# Patient Record
Sex: FEMALE
[De-identification: names, ages, dates, MRNs, and addresses within clinical notes are randomized per-mention and may not be internally consistent; named-entity substitution may affect disease eponyms.]

---

## 2019-07-30 ENCOUNTER — HOSPITAL ENCOUNTER (INPATIENT)
Dept: HOSPITAL 88 - ER | Age: 59
LOS: 4 days | Discharge: HOME | DRG: 354 | End: 2019-08-03
Attending: INTERNAL MEDICINE | Admitting: INTERNAL MEDICINE
Payer: COMMERCIAL

## 2019-07-30 VITALS — DIASTOLIC BLOOD PRESSURE: 78 MMHG | SYSTOLIC BLOOD PRESSURE: 175 MMHG

## 2019-07-30 VITALS — WEIGHT: 185 LBS | BODY MASS INDEX: 34.04 KG/M2 | HEIGHT: 62 IN

## 2019-07-30 DIAGNOSIS — L02.216: ICD-10-CM

## 2019-07-30 DIAGNOSIS — T81.89XA: ICD-10-CM

## 2019-07-30 DIAGNOSIS — K42.9: Primary | ICD-10-CM

## 2019-07-30 DIAGNOSIS — L03.316: ICD-10-CM

## 2019-07-30 LAB
ALBUMIN SERPL-MCNC: 3.9 G/DL (ref 3.5–5)
ALBUMIN/GLOB SERPL: 1 {RATIO} (ref 0.8–2)
ALP SERPL-CCNC: 177 IU/L (ref 40–150)
ALT SERPL-CCNC: 37 IU/L (ref 0–55)
ANION GAP SERPL CALC-SCNC: 15.1 MMOL/L (ref 8–16)
ANISOCYTOSIS BLD QL SMEAR: (no result)
BACTERIA URNS QL MICRO: (no result) /HPF
BASOPHILS # BLD AUTO: 0 10*3/UL (ref 0–0.1)
BASOPHILS NFR BLD AUTO: 0.3 % (ref 0–1)
BILIRUB UR QL: NEGATIVE
BUN SERPL-MCNC: 11 MG/DL (ref 7–26)
BUN/CREAT SERPL: 15 (ref 6–25)
CALCIUM SERPL-MCNC: 9.6 MG/DL (ref 8.4–10.2)
CHLORIDE SERPL-SCNC: 102 MMOL/L (ref 98–107)
CLARITY UR: (no result)
CO2 SERPL-SCNC: 24 MMOL/L (ref 22–29)
COLOR UR: YELLOW
DEPRECATED NEUTROPHILS # BLD AUTO: 4 10*3/UL (ref 2.1–6.9)
DEPRECATED RBC URNS MANUAL-ACNC: (no result) /HPF (ref 0–5)
EGFRCR SERPLBLD CKD-EPI 2021: > 60 ML/MIN (ref 60–?)
EOSINOPHIL # BLD AUTO: 0.1 10*3/UL (ref 0–0.4)
EOSINOPHIL NFR BLD AUTO: 1.3 % (ref 0–6)
EPI CELLS URNS QL MICRO: (no result) /LPF
ERYTHROCYTE [DISTWIDTH] IN CORD BLOOD: 13.3 % (ref 11.7–14.4)
GLOBULIN PLAS-MCNC: 3.8 G/DL (ref 2.3–3.5)
GLUCOSE SERPLBLD-MCNC: 99 MG/DL (ref 74–118)
HCT VFR BLD AUTO: 40.4 % (ref 34.2–44.1)
HGB BLD-MCNC: 13.6 G/DL (ref 12–16)
HOWELL-JOLLY BOD BLD QL SMEAR: (no result)
HYPOCHROMIA BLD QL SMEAR: SLIGHT
KETONES UR QL STRIP.AUTO: NEGATIVE
LEUKOCYTE ESTERASE UR QL STRIP.AUTO: NEGATIVE
LYMPHOCYTES # BLD: 1.9 10*3/UL (ref 1–3.2)
LYMPHOCYTES NFR BLD AUTO: 29.7 % (ref 18–39.1)
MCH RBC QN AUTO: 31.2 PG (ref 28–32)
MCHC RBC AUTO-ENTMCNC: 33.7 G/DL (ref 31–35)
MCV RBC AUTO: 92.7 FL (ref 81–99)
MONOCYTES # BLD AUTO: 0.4 10*3/UL (ref 0.2–0.8)
MONOCYTES NFR BLD AUTO: 5.6 % (ref 4.4–11.3)
NEUTS SEG NFR BLD AUTO: 62.8 % (ref 38.7–80)
NITRITE UR QL STRIP.AUTO: NEGATIVE
PLAT MORPH BLD: NORMAL
PLATELET # BLD AUTO: 288 X10E3/UL (ref 140–360)
PLATELET # BLD EST: ADEQUATE 10*3/UL
POIKILOCYTOSIS BLD QL SMEAR: (no result)
POTASSIUM SERPL-SCNC: 4.1 MMOL/L (ref 3.5–5.1)
PROT UR QL STRIP.AUTO: NEGATIVE
RBC # BLD AUTO: 4.36 X10E6/UL (ref 3.6–5.1)
RBC MORPH BLD: (no result)
SODIUM SERPL-SCNC: 137 MMOL/L (ref 136–145)
SP GR UR STRIP: 1.01 (ref 1.01–1.02)
UROBILINOGEN UR STRIP-MCNC: 0.2 MG/DL (ref 0.2–1)
WBC #/AREA URNS HPF: (no result) /HPF (ref 0–5)

## 2019-07-30 PROCEDURE — 80048 BASIC METABOLIC PNL TOTAL CA: CPT

## 2019-07-30 PROCEDURE — 96372 THER/PROPH/DIAG INJ SC/IM: CPT

## 2019-07-30 PROCEDURE — 87075 CULTR BACTERIA EXCEPT BLOOD: CPT

## 2019-07-30 PROCEDURE — 82948 REAGENT STRIP/BLOOD GLUCOSE: CPT

## 2019-07-30 PROCEDURE — 85025 COMPLETE CBC W/AUTO DIFF WBC: CPT

## 2019-07-30 PROCEDURE — 81001 URINALYSIS AUTO W/SCOPE: CPT

## 2019-07-30 PROCEDURE — 74177 CT ABD & PELVIS W/CONTRAST: CPT

## 2019-07-30 PROCEDURE — 99284 EMERGENCY DEPT VISIT MOD MDM: CPT

## 2019-07-30 PROCEDURE — 36415 COLL VENOUS BLD VENIPUNCTURE: CPT

## 2019-07-30 PROCEDURE — 87086 URINE CULTURE/COLONY COUNT: CPT

## 2019-07-30 PROCEDURE — 87205 SMEAR GRAM STAIN: CPT

## 2019-07-30 PROCEDURE — 80053 COMPREHEN METABOLIC PANEL: CPT

## 2019-07-30 PROCEDURE — 87071 CULTURE AEROBIC QUANT OTHER: CPT

## 2019-07-30 RX ADMIN — INSULIN HUMAN SCH UNIT: 100 INJECTION, SOLUTION PARENTERAL at 21:00

## 2019-07-30 RX ADMIN — LEVOFLOXACIN SCH MLS/HR: 5 INJECTION, SOLUTION INTRAVENOUS at 17:19

## 2019-07-30 NOTE — DIAGNOSTIC IMAGING REPORT
EXAM:  CT of the abdomen and pelvis WITH contrast



HISTORY:  Abdominal pain, rule out periumbilical abscess versus hernia, history

of appendectomy and 

COMPARISON:  None available.



TECHNIQUE: 

The abdomen and pelvis were scanned utilizing a multidetector helical scanner. 

Coronal and sagittal reformats are provided.

            PROTOCOL:  Routine

            IV CONTRAST:  100 cc of Isovue-370.

            ORAL CONTRAST:  Water

            RADIATION DOSE: Total DLP: 710.55 mGy*cm

                      Estimated effective dose:  (DLP x 0.015 x size factor)

Dose modulation, iterative reconstruction, and/or weight based adjustment of

the mA/kV was utilized to reduce the radiation dose to as low as reasonably

achievable.

            COMPLICATIONS: None



FINDINGS:

LOWER THORAX: Unremarkable.



HEPATOBILIARY:  

No mass.

No biliary dilation.

No calcified gallstone.

SPLEEN: No splenomegaly.     

PANCREAS: No focal masses or ductal dilatation.     

ADRENALS:  No discrete adrenal nodule.

KIDNEYS/URETERS: 

No hydronephrosis, stones, or definite solid mass lesions.    



PELVIC ORGANS/BLADDER: The visualized pelvic organs appear unremarkable.



GI TRACT: 

No dilation or wall thickening identified.



PERITONEUM / RETROPERITONEUM: No free air or fluid.

LYMPH NODES: No pathologically enlarged lymph node.

VESSELS: Minimal scattered atherosclerotic vascular calcifications.

BONES and JOINTS:  No aggressive osseous lesion or acute fracture. 

SOFT TISSUES: A small fat-containing umbilical hernia. In the superficial

periumbilical region there is prominent focal fat stranding,     2.2  cm (AP) x

 2.5  cm (ML) x  2.5  cm (CC). No central drainable fluid collection.





IMPRESSION: 

Prominent focal, superficial, fat stranding in the periumbilical region.

Considerations include vascular compromise involving the most superficial

portion of a small fat-containing umbilical hernia or evolving superficial

phlegmon.



Signed by: Dr. Pernell Segura D.O., M.M.M. on 2019 5:57 PM

## 2019-07-30 NOTE — NUR
PATIENT AWAKE AND ALERT. RESP EVEN AND UNLABORED. SKIN WARM AND DRY. NO SIGNS 
OF ACUTE DISTRESS NOTED AT THIS TIME. STATES PAIN "0" NOW AFTER PAIN 
MEDICATION. EDUCATED PATIENT ON THE CURRENT PLAN OF CARE,VERBALIZED 
UNDERSTANDING.

## 2019-07-31 VITALS — DIASTOLIC BLOOD PRESSURE: 67 MMHG | SYSTOLIC BLOOD PRESSURE: 146 MMHG

## 2019-07-31 VITALS — DIASTOLIC BLOOD PRESSURE: 70 MMHG | SYSTOLIC BLOOD PRESSURE: 164 MMHG

## 2019-07-31 VITALS — SYSTOLIC BLOOD PRESSURE: 151 MMHG | DIASTOLIC BLOOD PRESSURE: 70 MMHG

## 2019-07-31 VITALS — SYSTOLIC BLOOD PRESSURE: 135 MMHG | DIASTOLIC BLOOD PRESSURE: 62 MMHG

## 2019-07-31 VITALS — DIASTOLIC BLOOD PRESSURE: 72 MMHG | SYSTOLIC BLOOD PRESSURE: 152 MMHG

## 2019-07-31 VITALS — SYSTOLIC BLOOD PRESSURE: 164 MMHG | DIASTOLIC BLOOD PRESSURE: 70 MMHG

## 2019-07-31 VITALS — DIASTOLIC BLOOD PRESSURE: 62 MMHG | SYSTOLIC BLOOD PRESSURE: 129 MMHG

## 2019-07-31 VITALS — SYSTOLIC BLOOD PRESSURE: 135 MMHG | DIASTOLIC BLOOD PRESSURE: 63 MMHG

## 2019-07-31 LAB
ALBUMIN SERPL-MCNC: 3.3 G/DL (ref 3.5–5)
ALBUMIN/GLOB SERPL: 0.9 {RATIO} (ref 0.8–2)
ALP SERPL-CCNC: 152 IU/L (ref 40–150)
ALT SERPL-CCNC: 33 IU/L (ref 0–55)
ANION GAP SERPL CALC-SCNC: 14.1 MMOL/L (ref 8–16)
BASOPHILS # BLD AUTO: 0 10*3/UL (ref 0–0.1)
BASOPHILS NFR BLD AUTO: 0.3 % (ref 0–1)
BUN SERPL-MCNC: 11 MG/DL (ref 7–26)
BUN/CREAT SERPL: 15 (ref 6–25)
CALCIUM SERPL-MCNC: 9.2 MG/DL (ref 8.4–10.2)
CHLORIDE SERPL-SCNC: 105 MMOL/L (ref 98–107)
CO2 SERPL-SCNC: 23 MMOL/L (ref 22–29)
DEPRECATED NEUTROPHILS # BLD AUTO: 6.6 10*3/UL (ref 2.1–6.9)
EGFRCR SERPLBLD CKD-EPI 2021: > 60 ML/MIN (ref 60–?)
EOSINOPHIL # BLD AUTO: 0.1 10*3/UL (ref 0–0.4)
EOSINOPHIL NFR BLD AUTO: 0.8 % (ref 0–6)
ERYTHROCYTE [DISTWIDTH] IN CORD BLOOD: 13.1 % (ref 11.7–14.4)
GLOBULIN PLAS-MCNC: 3.5 G/DL (ref 2.3–3.5)
GLUCOSE SERPLBLD-MCNC: 172 MG/DL (ref 74–118)
HCT VFR BLD AUTO: 37.1 % (ref 34.2–44.1)
HGB BLD-MCNC: 12.1 G/DL (ref 12–16)
LYMPHOCYTES # BLD: 1.7 10*3/UL (ref 1–3.2)
LYMPHOCYTES NFR BLD AUTO: 18.7 % (ref 18–39.1)
MCH RBC QN AUTO: 30.6 PG (ref 28–32)
MCHC RBC AUTO-ENTMCNC: 32.6 G/DL (ref 31–35)
MCV RBC AUTO: 93.9 FL (ref 81–99)
MONOCYTES # BLD AUTO: 0.4 10*3/UL (ref 0.2–0.8)
MONOCYTES NFR BLD AUTO: 5 % (ref 4.4–11.3)
NEUTS SEG NFR BLD AUTO: 74.9 % (ref 38.7–80)
PLATELET # BLD AUTO: 220 X10E3/UL (ref 140–360)
POTASSIUM SERPL-SCNC: 4.1 MMOL/L (ref 3.5–5.1)
RBC # BLD AUTO: 3.95 X10E6/UL (ref 3.6–5.1)
SODIUM SERPL-SCNC: 138 MMOL/L (ref 136–145)

## 2019-07-31 PROCEDURE — 0J9C0ZZ DRAINAGE OF PELVIC REGION SUBCUTANEOUS TISSUE AND FASCIA, OPEN APPROACH: ICD-10-PCS | Performed by: SURGERY

## 2019-07-31 PROCEDURE — 0WQF0ZZ REPAIR ABDOMINAL WALL, OPEN APPROACH: ICD-10-PCS | Performed by: SURGERY

## 2019-07-31 RX ADMIN — CLINDAMYCIN PHOSPHATE SCH MLS/HR: 18 INJECTION, SOLUTION INTRAVENOUS at 11:49

## 2019-07-31 RX ADMIN — CLINDAMYCIN PHOSPHATE SCH MLS/HR: 18 INJECTION, SOLUTION INTRAVENOUS at 00:00

## 2019-07-31 RX ADMIN — INSULIN HUMAN SCH UNIT: 100 INJECTION, SOLUTION PARENTERAL at 07:30

## 2019-07-31 RX ADMIN — HYDROCODONE BITARTRATE AND ACETAMINOPHEN PRN EA: 5; 325 TABLET ORAL at 11:57

## 2019-07-31 RX ADMIN — CLINDAMYCIN PHOSPHATE SCH MLS/HR: 18 INJECTION, SOLUTION INTRAVENOUS at 06:00

## 2019-07-31 RX ADMIN — ENOXAPARIN SODIUM SCH MG: 40 INJECTION SUBCUTANEOUS at 17:00

## 2019-07-31 RX ADMIN — SODIUM CHLORIDE PRN MG: 900 INJECTION INTRAVENOUS at 00:19

## 2019-07-31 RX ADMIN — INSULIN HUMAN SCH UNIT: 100 INJECTION, SOLUTION PARENTERAL at 16:06

## 2019-07-31 RX ADMIN — CLINDAMYCIN PHOSPHATE SCH MLS/HR: 18 INJECTION, SOLUTION INTRAVENOUS at 18:00

## 2019-07-31 RX ADMIN — INSULIN HUMAN SCH UNIT: 100 INJECTION, SOLUTION PARENTERAL at 11:30

## 2019-07-31 RX ADMIN — SODIUM CHLORIDE PRN MG: 900 INJECTION INTRAVENOUS at 10:45

## 2019-07-31 RX ADMIN — HYDROCODONE BITARTRATE AND ACETAMINOPHEN PRN EA: 5; 325 TABLET ORAL at 23:55

## 2019-07-31 RX ADMIN — LEVOFLOXACIN SCH MLS/HR: 5 INJECTION, SOLUTION INTRAVENOUS at 15:45

## 2019-07-31 RX ADMIN — INSULIN HUMAN SCH UNIT: 100 INJECTION, SOLUTION PARENTERAL at 21:20

## 2019-07-31 RX ADMIN — CLINDAMYCIN PHOSPHATE SCH MLS/HR: 18 INJECTION, SOLUTION INTRAVENOUS at 23:30

## 2019-07-31 NOTE — NUR
PT RESTING AND GIVEN PAIN MEDICATION X1 .FAMILY AT THE BEDSIDE .CALL LIGHT WITH IN REACH 
.CONTINUE TO MONITOR 

-------------------------------------------------------------------------------

Addendum: 07/31/19 at 0643 by Nancy Wang RN

-------------------------------------------------------------------------------

WRONG PT

## 2019-07-31 NOTE — NUR
DR. DOYLE at the bedside, discussed with the patient that he was going to consult DR. PROCTOR 
for antibiotic therapy.  also gave verbal orders to d/c the IV fluids and saline lock the 
patient.

## 2019-07-31 NOTE — OPERATIVE REPORT
DATE OF PROCEDURE:  07/31/2019

 

SURGEON:  Jonathan Hurt MD

 

PREOPERATIVE DIAGNOSES:  Umbilical abscess and umbilical hernia.

 

POSTOPERATIVE DIAGNOSES:  Umbilical abscess and umbilical hernia.

 

OPERATIVE PROCEDURE:  Drainage of umbilical abscess and repair of umbilical hernia.

 

ANESTHESIA:  General, Dr. Campuzano.

 

INDICATION:  A 59-year-old female with one-week history of umbilical pain, swelling, and

redness.  CT scan showed umbilical hernia with fat stranding in the umbilical region

suggestive of evolving infection.  The patient is diagnosed with possible umbilical

abscess secondary to suture granuloma and she has consented for drainage for abscess,

possible removal of suture granuloma and umbilical hernia repair. 

 

PROCEDURE FINDING:  No suture material found.

 

DESCRIPTION OF PROCEDURE:  The patient was brought to OR, intubated.  The abdomen was

prepped with alcohol and draped in sterile fashion.  The small umbilical wound was

enlarged in the transverse direction and exploring the depth of the wound, no suture

material from prior surgeries found even though the patient has a long low midline scar

starting at the umbilicus.  Thorough exploration revealed the umbilical hernia

approximately 2 cm in size, which is a fascial defect.  We then irrigated the wound

thoroughly and closed the fascial defect with figure-of-eight stitch of 0 Vicryl suture.

 The wound was left open and packed with iodoform gauze.  Hemostasis achieved.  Local

anesthesia with 0.25% Marcaine and epinephrine were injected into the wound.  The

patient was then extubated and transported to recovery room. 

 

ESTIMATED BLOOD LOSS:  Minimal.

 

 

 

 

______________________________

Jonathan Hurt MD

 

DNL/MODL

D:  07/31/2019 09:54:20

T:  07/31/2019 16:06:18

Job #:  [id number]

## 2019-07-31 NOTE — NUR
received report from PACU. pt is s/p I&D of abscess at the umbilicus. incision site is 
packed with iodoform and covered with dressing. vital signs are 112/81, hr 65, temp 98.0 
degrees F, O2 sat 94% on room air

## 2019-07-31 NOTE — NUR
PT RESTING .PT C/O ABD PAIN AND GIVEN MORPHINE WITH ZOFRAN .CALL BELL WITH IN REACH 
.CONTINUE TO MONITOR

## 2019-07-31 NOTE — CONSULTATION
DATE OF CONSULTATION:  2019  

 

CHIEF COMPLAINT:  Umbilical pain.

 

HISTORY OF PRESENT ILLNESS:  This patient is a 59-year-old female with 1-week history of

periumbilical redness, swelling and pain with no fever or chills.  No prior episode.

The patient was noted to have drainage from the umbilical area in this a.m. 

 

PAST MEDICAL HISTORY:  Positive for hypertension, diabetes, hypothyroidism.

 

PAST SURGICAL HISTORY:  Positive for hysterectomy, appendectomy, and .

 

ALLERGIES:  SHE HAS NO DRUG ALLERGIES.

 

SOCIAL HABITS:  The patient denies smoking or alcohol abuse.

 

REVIEW OF SYSTEMS:

No chest pain, shortness of breath.

 

PHYSICAL EXAMINATION:

VITAL SIGNS:  Stable.  She is afebrile.  She is awake, alert, in moderate discomfort. 

HEENT:  Sclerae anicteric. 

NECK:  Supple. 

LUNGS:  Clear. 

HEART:  Regular rate and rhythm. 

ABDOMEN:  Soft.  There is periumbilical erythema with a small opening in the depth of

the umbilicus with exudative drainage.  Abdomen otherwise soft. 

EXTREMITIES:  No cyanosis or edema.

LABORATORY DATA:  White cell count is 8, hemoglobin of 12.  Creatinine of 0.7.  CT scan

of the abdomen showed inflammatory fat stranding in the periumbilical region. 

 

ASSESSMENT:  Probable umbilical abscess secondary to suture granuloma.

 

PLAN:  Exploration of the umbilical with drainage for abscess and removal of foreign

body/suture material.  Attendant risks discussed. 

 

 

 

 

______________________________

Jonathan Hurt MD

 

DNMADHAVI/MODL

D:  2019 08:33:24

T:  2019 14:21:39

Job #:  [id number]

## 2019-08-01 VITALS — DIASTOLIC BLOOD PRESSURE: 60 MMHG | SYSTOLIC BLOOD PRESSURE: 130 MMHG

## 2019-08-01 VITALS — DIASTOLIC BLOOD PRESSURE: 57 MMHG | SYSTOLIC BLOOD PRESSURE: 115 MMHG

## 2019-08-01 VITALS — DIASTOLIC BLOOD PRESSURE: 68 MMHG | SYSTOLIC BLOOD PRESSURE: 148 MMHG

## 2019-08-01 VITALS — DIASTOLIC BLOOD PRESSURE: 66 MMHG | SYSTOLIC BLOOD PRESSURE: 140 MMHG

## 2019-08-01 VITALS — SYSTOLIC BLOOD PRESSURE: 131 MMHG | DIASTOLIC BLOOD PRESSURE: 65 MMHG

## 2019-08-01 VITALS — DIASTOLIC BLOOD PRESSURE: 71 MMHG | SYSTOLIC BLOOD PRESSURE: 134 MMHG

## 2019-08-01 VITALS — SYSTOLIC BLOOD PRESSURE: 130 MMHG | DIASTOLIC BLOOD PRESSURE: 60 MMHG

## 2019-08-01 LAB
ANION GAP SERPL CALC-SCNC: 14.1 MMOL/L (ref 8–16)
BASOPHILS # BLD AUTO: 0 10*3/UL (ref 0–0.1)
BASOPHILS NFR BLD AUTO: 0.1 % (ref 0–1)
BUN SERPL-MCNC: 12 MG/DL (ref 7–26)
BUN/CREAT SERPL: 17 (ref 6–25)
CALCIUM SERPL-MCNC: 9.3 MG/DL (ref 8.4–10.2)
CHLORIDE SERPL-SCNC: 106 MMOL/L (ref 98–107)
CO2 SERPL-SCNC: 23 MMOL/L (ref 22–29)
DEPRECATED NEUTROPHILS # BLD AUTO: 5.1 10*3/UL (ref 2.1–6.9)
EGFRCR SERPLBLD CKD-EPI 2021: > 60 ML/MIN (ref 60–?)
EOSINOPHIL # BLD AUTO: 0 10*3/UL (ref 0–0.4)
EOSINOPHIL NFR BLD AUTO: 0.1 % (ref 0–6)
ERYTHROCYTE [DISTWIDTH] IN CORD BLOOD: 13.2 % (ref 11.7–14.4)
GLUCOSE SERPLBLD-MCNC: 162 MG/DL (ref 74–118)
HCT VFR BLD AUTO: 34.4 % (ref 34.2–44.1)
HGB BLD-MCNC: 11.2 G/DL (ref 12–16)
LYMPHOCYTES # BLD: 2.3 10*3/UL (ref 1–3.2)
LYMPHOCYTES NFR BLD AUTO: 28.8 % (ref 18–39.1)
MCH RBC QN AUTO: 30.7 PG (ref 28–32)
MCHC RBC AUTO-ENTMCNC: 32.6 G/DL (ref 31–35)
MCV RBC AUTO: 94.2 FL (ref 81–99)
MONOCYTES # BLD AUTO: 0.4 10*3/UL (ref 0.2–0.8)
MONOCYTES NFR BLD AUTO: 5.5 % (ref 4.4–11.3)
NEUTS SEG NFR BLD AUTO: 65.2 % (ref 38.7–80)
PLATELET # BLD AUTO: 247 X10E3/UL (ref 140–360)
POTASSIUM SERPL-SCNC: 4.1 MMOL/L (ref 3.5–5.1)
RBC # BLD AUTO: 3.65 X10E6/UL (ref 3.6–5.1)
SODIUM SERPL-SCNC: 139 MMOL/L (ref 136–145)

## 2019-08-01 RX ADMIN — INSULIN HUMAN SCH UNIT: 100 INJECTION, SOLUTION PARENTERAL at 20:46

## 2019-08-01 RX ADMIN — CLINDAMYCIN PHOSPHATE SCH MLS/HR: 18 INJECTION, SOLUTION INTRAVENOUS at 11:50

## 2019-08-01 RX ADMIN — LEVOFLOXACIN SCH MLS/HR: 5 INJECTION, SOLUTION INTRAVENOUS at 15:15

## 2019-08-01 RX ADMIN — HYDROCODONE BITARTRATE AND ACETAMINOPHEN PRN EA: 5; 325 TABLET ORAL at 12:11

## 2019-08-01 RX ADMIN — CLINDAMYCIN PHOSPHATE SCH MLS/HR: 18 INJECTION, SOLUTION INTRAVENOUS at 17:07

## 2019-08-01 RX ADMIN — ENOXAPARIN SODIUM SCH MG: 40 INJECTION SUBCUTANEOUS at 17:07

## 2019-08-01 RX ADMIN — INSULIN HUMAN SCH UNIT: 100 INJECTION, SOLUTION PARENTERAL at 07:30

## 2019-08-01 RX ADMIN — INSULIN HUMAN SCH UNIT: 100 INJECTION, SOLUTION PARENTERAL at 11:52

## 2019-08-01 RX ADMIN — INSULIN HUMAN SCH UNIT: 100 INJECTION, SOLUTION PARENTERAL at 17:12

## 2019-08-01 RX ADMIN — CLINDAMYCIN PHOSPHATE SCH MLS/HR: 18 INJECTION, SOLUTION INTRAVENOUS at 05:51

## 2019-08-01 NOTE — NUR
Nutrition Screen Note



RD Recommendation for Physician: 

-Continue current diet as ordered 



Plan of Care: RD following, monitoring for tolerance and adequacy



Nutrition reason for involvement:

Nutrition Risk Trigger  MST 



Primary Diagnose(s): Abdominal wall periumbilical abscess, status post incision with 
underlying cellulitis.



PMH: None 



Ht: 62in 

Wt: 210lb verified with bedscale

BMI: 38.4kg/m2

IBW: 110lb +/- 10%



RD Assessment:

(8/1) Chart reviewed. Labs and meds reviewed. 60yo F, who was admitted for umbilical pain. 
Visited pt in the room. Pt reported good appetite with 100% meal intake. Pt complained of 
some abdominal discomfort but denied any nausea or vomiting. Pt also denied any chewing or 
swallowing difficulty. Weight has been stable with reported UBW ~200-205lbs. Will continue 
to monitor and follow. 



Current Diet: ADA 1800



Malnutrition Evaluation (8/1/2019)

The patient does not meet criteria for a specified degree of malnutrition at this time. Will 
re-evaluate at follow-up as appropriate. 



Diet Education Needs Assessment:

Diet education not indicated.



Nutrition Care Level: low 





Signed: Marisa Redd, MS, RD, LD

## 2019-08-01 NOTE — HISTORY AND PHYSICAL
CHIEF COMPLAINT:  Umbilical pain.

 

HISTORY OF PRESENT ILLNESS:  This is a 59-year-old  female, who reports that she

has been having this umbilical redness and pain ongoing for the last week.  The patient

reports initially she thought that after picking up some object that she had pain that

it was related to heavy objects.  Instead, she reports having some subjective fevers at

home.  She denies any chest pain, palpitation, nausea, or vomiting.  Denies any

significant abdominal pain.  The patient was concerned and came into the ED for further

evaluation.  On imaging studies, there was some concern of a possible periumbilical

obstruction that ends up being more of a periumbilical superficial phlegmon, in which

the patient underwent an incision and drainage by General Surgery.  The patient is seen

and evaluated at bedside on the medical floor.  She is currently doing well with no

other complaints at this time. 

 

REVIEW OF SYSTEMS:

Pertinent positives:  Periumbilical pain. 

 

Pertinent negatives:  Denies any chest pain, palpitation, nausea, vomiting, diarrhea,

dysuria, hematuria, frequency, urgency, lightheadedness, dizziness, abdominal pain,

headaches, shortness of breath, cough, congestion, fever, or any other complaints. 

 

The rest of 14-point review of systems are reviewed with the patient and are negative.

 

ALLERGIES:  NO KNOWN DRUG ALLERGIES.

 

HOME MEDICATIONS:  None.

 

PAST MEDICAL HISTORY:  None.

 

PAST SURGICAL HISTORY:  None.

 

FAMILY HISTORY:  Hypertension and diabetes.

 

SOCIAL HISTORY:  No drugs.  No alcohol.  Does not smoke.  Good social support.

 

PHYSICAL EXAMINATION:

VITAL SIGNS:  Temperature is 97.8, pulse 66, respiratory rate 16, blood pressure is

135/62, and pulse ox is 97% on room air. 

GENERAL:  Not in acute distress.  Alert and oriented x3.  Cooperative on examination. 

HEENT:  Head is normocephalic and atraumatic.  Eyes; pupils are equal, round, and

reactive to light bilaterally.  Extraocular movements intact bilaterally.  Throat, no

evidence of erythema or exudates in the posterior pharynx.  Has poor dentition. 

NECK:  Supple.  Good range of motion. 

PULMONARY:  Clear to auscultation bilaterally.  No wheezing, no rales, no rhonchi, no

crackles appreciated. 

CARDIOVASCULAR:  Positive S1, S2.  No murmurs, rubs, or gallops appreciated. 

ABDOMEN:  Soft, nondistended, and nontender to palpation.  Bowel sounds present. 

MUSCULOSKELETAL:  Strength is 5/5 throughout.  No evidence of any muscles deficits on

examination.  No weakness appreciated. 

NEUROLOGIC:  Cranial nerves II through XII are grossly intact.  No evidence of any

neurological deficits on exam. 

SKIN:  Intact.  Warm to touch.  Good cap refill.  There is a periumbilical incision and

drainage performed by Dr. Hurt, now currently covered with gauze. 

PSYCHIATRIC:  Normal affect and mood. 

EXTREMITIES:  No edema.  Good range of motion throughout.

LAB FINDINGS:  Show white count 8.8, hemoglobin 12, hematocrit is 37, and platelets of

220.  Chemistry; sodium 138, potassium 4.1, chloride 105, bicarb 23, anion gap of 14,

BUN is 11, creatinine is 0.74, glucose is 172, calcium 9.2, AST 25, ALT 33, alkaline

phosphatase 152, total protein 6.8, albumin 3.3.  Urinalysis was negative. 

 

MICROBIOLOGY:  Urine cultures were negative.  Wound cultures are pending.

 

IMAGING STUDIES:  CT abdomen and pelvis shows a prominent focal superficial fat

stranding in the periumbilical region __________ involved in superficial phlegmon. 

 

IMPRESSION:  

1. Abdominal wall periumbilical abscess, status post incision with underlying cellulitis.

2. Subjective fever.

 

PLAN:  At this time, the patient underwent status post incision and drainage at bedside

by General Surgery.  A wound culture was sent.  Urine cultures were negative.  Continue

with IV antibiotics for now.  Follow General Surgery recommendations.  ID consultation.

Put on Lovenox for DVT prophylaxis and continue with home medications. 

 

 

 

 

______________________________

MD THERESA Lewis/EM

D:  07/31/2019 16:43:44

T:  07/31/2019 23:55:14

Job #:  [id number]

## 2019-08-01 NOTE — CONSULTATION
DATE OF CONSULTATION:    

 

REASON FOR CONSULTATION:  This patient has umbilical abscess, abdominal abscess.

 

HISTORY OF PRESENT ILLNESS:  This patient, who is a 59-year-old  female, comes

in with abdominal pain.  She said a week ago she was at work and she put something heavy

trash.  She said after she picked up, she suffered abdominal pain.  She reported to her

worker and [facility] gave her some ibuprofen, but continues

to have abdominal pain.  There was concern that maybe there is something more, so she

was sent here.  She was seen by Dr. Buckley yesterday.  She was found to have

superficial phlegmon, underwent I and D, and I was asked to see her. 

 

PAST MEDICAL HISTORY:  She denies.

 

PAST SURGICAL HISTORY:  She denies.

 

ALLERGIES:  NKA.

 

SOCIAL HISTORY:  There is no smoking, drug abuse, or alcohol abuse.

 

FAMILY HISTORY:  Noncontributory.

 

REVIEW OF SYSTEMS:

At present time: 

HEENT:  Negative. 

PULMONARY:  Negative. 

CARDIAC:  Negative. 

:  Negative.  As above.

 

LABORATORY DATA:  Reviewed.  White count 6.3, today 7.88, and hemoglobin of 11.2.

Sodium 139, potassium 4.1, and creatinine 0.69. 

 

MEDICATION LIST:  She is currently on clindamycin and levofloxacin.

 

PHYSICAL EXAMINATION:

GENERAL:  She is currently alert and oriented, does not seem to be in acute distress. 

VITAL SIGNS:  Stable.  Currently afebrile. 

HEENT:  She is not icteric. 

NECK:  Supple. 

CHEST:  Clear. 

HEART:  S1 and S2.  No murmur. 

ABDOMEN:  Soft.  Bowel sounds present.  I do not see any erythema or edema at the

present time. 

IMPRESSION:  Abdominal wall abscesses, status post debridement.  Continue with IV 

antibiotic.  Await culture and sensitivity.  Can change to oral antibiotics soon once we

have sensitivity.  Continue with local care. 

 

 

 

______________________________

MD ELLIOT Mir/EM

D:  08/01/2019 12:44:52

T:  08/01/2019 16:14:16

Job #:  [id number]

## 2019-08-01 NOTE — PROGRESS NOTE
DATE:  08/01/2019

 

Medicine Progress Note 

 

SUBJECTIVE:  The patient is doing well today with no complaints.  Reports just some

minimal pain around the incision site. 

 

PHYSICAL EXAMINATION:

VITAL SIGNS:  She is afebrile, normotensive, and respiratory rate is good. 

GENERAL:  In no acute distress, alert, and oriented x3.  Cooperative on examination. 

HEENT:  Head is normocephalic and atraumatic.  Eyes, pupils are equal, round, and

reactive to light bilaterally.  Extraocular movements are intact bilaterally.  Throat,

no evidence of erythema or exudates in the posterior pharynx.  Has poor dentition. 

NECK:  Supple.  Good range of motion. 

PULMONARY:  Clear to auscultation bilaterally.  No wheezing, no rales, no rhonchi, no

crackles appreciated. 

CARDIOVASCULAR:  Positive S1, S2.  No murmurs, rubs, or gallops appreciated. 

ABDOMEN:  Soft, nondistended, and nontender to palpation.  Bowel sounds present. 

MUSCULOSKELETAL:  Strength is 5/5 throughout.  No evidence of any muscle deficits on

examination.  No weakness appreciated. 

NEUROLOGICAL:  Cranial nerves II through XII grossly intact.  No evidence of any

neurological deficits on exam. 

SKIN:  Intact.  Warm to touch.  Good cap refill. 

PSYCHIATRIC:  Normal affect and mood. 

EXTREMITIES:  No edema.  Good range of motion throughout.

LABORATORY DATA:  Reviewed and stable.

 

MICROBIOLOGY:  Wound cultures are pending.

 

IMPRESSION:  

1. Abdominal wall periumbilical abscess, status post incision and drainage with

underlying cellulitis. 

2. Subjective fever.

 

PLAN:  At this time, continue monitoring wound cultures.  ID is following.  IV

antibiotics as ordered.  Urine cultures are negative. 

Surgery is following as well.  Continue same plan of care and monitor closely.

 

 

 

 

______________________________

MD THERESA Lewis/EM

D:  08/01/2019 17:04:48

T:  08/01/2019 19:58:14

Job #:  [id number]

## 2019-08-01 NOTE — NUR
PATIENT A/O X3, EVEN RESPIRATIONS ON RA. ABDOMINAL DRESSING INTACT WITH MINIMAL DRAINAGE. 
PATIENT DENIES PAIN AT THIS TIME. LEFT AC 20 GAUGE IV SL. IV INTACT AND PATENT. PATIENT 
AMBULATES INDEPENDENTLY. VITAL SIGNS STABLE. CALL LIGHT IN REACH. WILL CONTINUE TO MONITOR 
PATIENT.

## 2019-08-02 VITALS — SYSTOLIC BLOOD PRESSURE: 143 MMHG | DIASTOLIC BLOOD PRESSURE: 73 MMHG

## 2019-08-02 VITALS — DIASTOLIC BLOOD PRESSURE: 81 MMHG | SYSTOLIC BLOOD PRESSURE: 136 MMHG

## 2019-08-02 VITALS — DIASTOLIC BLOOD PRESSURE: 57 MMHG | SYSTOLIC BLOOD PRESSURE: 121 MMHG

## 2019-08-02 VITALS — SYSTOLIC BLOOD PRESSURE: 156 MMHG | DIASTOLIC BLOOD PRESSURE: 71 MMHG

## 2019-08-02 VITALS — DIASTOLIC BLOOD PRESSURE: 71 MMHG | SYSTOLIC BLOOD PRESSURE: 156 MMHG

## 2019-08-02 VITALS — SYSTOLIC BLOOD PRESSURE: 125 MMHG | DIASTOLIC BLOOD PRESSURE: 62 MMHG

## 2019-08-02 VITALS — DIASTOLIC BLOOD PRESSURE: 62 MMHG | SYSTOLIC BLOOD PRESSURE: 128 MMHG

## 2019-08-02 VITALS — DIASTOLIC BLOOD PRESSURE: 62 MMHG | SYSTOLIC BLOOD PRESSURE: 125 MMHG

## 2019-08-02 RX ADMIN — INSULIN HUMAN SCH UNIT: 100 INJECTION, SOLUTION PARENTERAL at 11:30

## 2019-08-02 RX ADMIN — INSULIN HUMAN SCH UNIT: 100 INJECTION, SOLUTION PARENTERAL at 16:30

## 2019-08-02 RX ADMIN — HYDROCODONE BITARTRATE AND ACETAMINOPHEN PRN EA: 5; 325 TABLET ORAL at 01:13

## 2019-08-02 RX ADMIN — INSULIN HUMAN SCH UNIT: 100 INJECTION, SOLUTION PARENTERAL at 22:00

## 2019-08-02 RX ADMIN — CEFEPIME HYDROCHLORIDE SCH MLS/HR: 1 INJECTION, POWDER, FOR SOLUTION INTRAMUSCULAR; INTRAVENOUS at 22:00

## 2019-08-02 RX ADMIN — CEFEPIME HYDROCHLORIDE SCH MLS/HR: 1 INJECTION, POWDER, FOR SOLUTION INTRAMUSCULAR; INTRAVENOUS at 10:59

## 2019-08-02 RX ADMIN — VANCOMYCIN HYDROCHLORIDE SCH MLS/HR: 1 INJECTION, SOLUTION INTRAVENOUS at 22:35

## 2019-08-02 RX ADMIN — ENOXAPARIN SODIUM SCH MG: 40 INJECTION SUBCUTANEOUS at 17:00

## 2019-08-02 RX ADMIN — CLINDAMYCIN PHOSPHATE SCH MLS/HR: 18 INJECTION, SOLUTION INTRAVENOUS at 01:07

## 2019-08-02 RX ADMIN — VANCOMYCIN HYDROCHLORIDE SCH MLS/HR: 1 INJECTION, SOLUTION INTRAVENOUS at 11:00

## 2019-08-02 RX ADMIN — CLINDAMYCIN PHOSPHATE SCH MLS/HR: 18 INJECTION, SOLUTION INTRAVENOUS at 06:30

## 2019-08-02 RX ADMIN — INSULIN HUMAN SCH UNIT: 100 INJECTION, SOLUTION PARENTERAL at 07:30

## 2019-08-02 NOTE — NUR
BEDSIDE REPORT GIVEN TO ONCOMING NURSE. PATIENT IN STABLE CONDITION. NO NEEDS VOICED AT THIS 
TIME. BED LOCKED AND IN LOWEST POSITION, CALL LIGHT WITHIN EASY REACH.

## 2019-08-02 NOTE — PROGRESS NOTE
DATE:  08/02/2019

 

Medicine Progress Note 

 

SUBJECTIVE:  The patient is doing well today with no complaints.  No overnight events.

 

PHYSICAL EXAMINATION:

VITAL SIGNS:  Temperature is 96.5, pulse 68, respiratory rate is 20, blood pressure

156/71, and pulse ox 96% on room air. 

GENERAL:  Not in acute distress.  Alert and oriented x3.  Cooperative on examination. 

HEENT:  Head; normocephalic, atraumatic.  Eyes; pupils are equal, round, and reactive to

light bilaterally.  Extraocular movements intact bilaterally.  Throat; no evidence of

erythema or exudates in the posterior pharynx.  Has poor dentition. 

NECK:  Supple.  Good range of motion. 

PULMONARY:  Clear to auscultation bilaterally.  No wheezing, rhonchi, or crackles

appreciated. 

CARDIOVASCULAR:  Positive S1 and S2.  No murmurs, rubs, or gallops appreciated. 

ABDOMEN:  Soft, nondistended, and nontender to palpation.  Bowel sounds present. 

MUSCULOSKELETAL:  Strength is 5/5 throughout.  No evidence of any muscle deficits on

examination.  No weakness appreciated. 

NEUROLOGIC:  Cranial nerves II through XII grossly intact.  No evidence of any

neurological deficits on exam. 

SKIN:  Intact.  Warm to touch.  Good cap refill. 

PSYCHIATRIC:  Normal affect and mood. 

EXTREMITIES:  No edema.  Good range of motion throughout.

LABORATORY FINDINGS:  Show white count 7.8, hemoglobin 8.2, hematocrit is 34, and

platelets of 247.  Chemistry; sodium was 139, potassium was 4.1, chloride was 106,

bicarbonate was 23, actually BUN was 12, creatinine was 0.69. 

 

MICROBIOLOGY:  Wound cultures are pending currently, which is still preliminary.

 

IMPRESSION:  

1. Abdominal wall periumbilical abscess, status post incision and drainage with

underlying cellulitis. 

2. Subjective fever, resolved.

 

PLAN:  At this time, we are waiting for final results on the wound cultures.  General

Surgery had said the patient can be discharged to home.  I discussed the case with ID

and would like to monitor overnight.  Likely discharge tomorrow on oral antibiotics.

The wound dimension looks pretty good and possibly discharge home tomorrow. 

 

 

 

 

______________________________

MD THERESA Lewis/EM

D:  08/02/2019 18:47:21

T:  08/02/2019 21:14:44

Job #:  [id number]

## 2019-08-03 VITALS — DIASTOLIC BLOOD PRESSURE: 63 MMHG | SYSTOLIC BLOOD PRESSURE: 133 MMHG

## 2019-08-03 VITALS — DIASTOLIC BLOOD PRESSURE: 70 MMHG | SYSTOLIC BLOOD PRESSURE: 169 MMHG

## 2019-08-03 VITALS — DIASTOLIC BLOOD PRESSURE: 80 MMHG | SYSTOLIC BLOOD PRESSURE: 143 MMHG

## 2019-08-03 VITALS — SYSTOLIC BLOOD PRESSURE: 166 MMHG | DIASTOLIC BLOOD PRESSURE: 79 MMHG

## 2019-08-03 VITALS — SYSTOLIC BLOOD PRESSURE: 143 MMHG | DIASTOLIC BLOOD PRESSURE: 80 MMHG

## 2019-08-03 LAB
ANION GAP SERPL CALC-SCNC: 13.1 MMOL/L (ref 8–16)
BASOPHILS # BLD AUTO: 0 10*3/UL (ref 0–0.1)
BASOPHILS NFR BLD AUTO: 0.8 % (ref 0–1)
BUN SERPL-MCNC: 13 MG/DL (ref 7–26)
BUN/CREAT SERPL: 20 (ref 6–25)
CALCIUM SERPL-MCNC: 9.2 MG/DL (ref 8.4–10.2)
CHLORIDE SERPL-SCNC: 106 MMOL/L (ref 98–107)
CO2 SERPL-SCNC: 23 MMOL/L (ref 22–29)
DEPRECATED NEUTROPHILS # BLD AUTO: 2.3 10*3/UL (ref 2.1–6.9)
EGFRCR SERPLBLD CKD-EPI 2021: > 60 ML/MIN (ref 60–?)
EOSINOPHIL # BLD AUTO: 0.2 10*3/UL (ref 0–0.4)
EOSINOPHIL NFR BLD AUTO: 4 % (ref 0–6)
ERYTHROCYTE [DISTWIDTH] IN CORD BLOOD: 13.2 % (ref 11.7–14.4)
GLUCOSE SERPLBLD-MCNC: 129 MG/DL (ref 74–118)
HCT VFR BLD AUTO: 37.6 % (ref 34.2–44.1)
HGB BLD-MCNC: 12.3 G/DL (ref 12–16)
LYMPHOCYTES # BLD: 2.2 10*3/UL (ref 1–3.2)
LYMPHOCYTES NFR BLD AUTO: 42.8 % (ref 18–39.1)
MCH RBC QN AUTO: 30.7 PG (ref 28–32)
MCHC RBC AUTO-ENTMCNC: 32.7 G/DL (ref 31–35)
MCV RBC AUTO: 93.8 FL (ref 81–99)
MONOCYTES # BLD AUTO: 0.3 10*3/UL (ref 0.2–0.8)
MONOCYTES NFR BLD AUTO: 5.9 % (ref 4.4–11.3)
NEUTS SEG NFR BLD AUTO: 46.3 % (ref 38.7–80)
PLATELET # BLD AUTO: 237 X10E3/UL (ref 140–360)
POTASSIUM SERPL-SCNC: 4.1 MMOL/L (ref 3.5–5.1)
RBC # BLD AUTO: 4.01 X10E6/UL (ref 3.6–5.1)
SODIUM SERPL-SCNC: 138 MMOL/L (ref 136–145)

## 2019-08-03 RX ADMIN — HYDROCODONE BITARTRATE AND ACETAMINOPHEN PRN EA: 5; 325 TABLET ORAL at 00:26

## 2019-08-03 RX ADMIN — HYDROCODONE BITARTRATE AND ACETAMINOPHEN PRN EA: 5; 325 TABLET ORAL at 10:12

## 2019-08-03 RX ADMIN — INSULIN HUMAN SCH UNIT: 100 INJECTION, SOLUTION PARENTERAL at 07:30

## 2019-08-03 RX ADMIN — VANCOMYCIN HYDROCHLORIDE SCH MLS/HR: 1 INJECTION, SOLUTION INTRAVENOUS at 11:39

## 2019-08-03 RX ADMIN — INSULIN HUMAN SCH UNIT: 100 INJECTION, SOLUTION PARENTERAL at 14:49

## 2019-08-03 RX ADMIN — CEFEPIME HYDROCHLORIDE SCH MLS/HR: 1 INJECTION, POWDER, FOR SOLUTION INTRAMUSCULAR; INTRAVENOUS at 10:08

## 2019-08-04 NOTE — DISCHARGE SUMMARY
FINAL DISCHARGE DIAGNOSIS:  Abdominal wall periumbilical abscess, status post incision

and drainage with underlying cellulitis. 

 

CONSULTANTS:  General surgery, Infectious Disease.

 

VITAL SIGNS:  Temperature is 97.2, pulse 64, respiratory rate 18, blood pressure was

143/80, pulse ox 98% on room air. 

 

LAB FINDINGS:  Show white count 5, hemoglobin 12, hematocrit 37.6, and platelets of 237.

 Chemistry; sodium 138, potassium 4.1, chloride 106, bicarb 23, anion gap of 15, BUN 13,

creatinine 0.64, glucose 129, calcium is 9.2, total bilirubin is 0.9, AST is 25, ALT is

33, alkaline phosphatase is 152, albumin is 3.3.  Urinalysis was found to be negative. 

 

MICROBIOLOGY:  Wound cultures showed regular skin mary, some rare gram-positive cocci

in pairs and few gram-positive rods.  Urine cultures were negative. 

 

IMAGING STUDIES:  CT abdomen and pelvis showed prominent focal superficial fat stranding

in the periumbilical region.  Showed some small fat containing umbilical hernia and

involved superficial phlegmon. 

 

HOSPITAL COURSE:  This is a 59-year-old  female, came in with complaints of

worsening periumbilical pain, found to be phlegmon, cellulitis, abscess, in which Dr. Hurt was consulted.  The patient underwent status post incision and drainage of the

actual underlying abscess in the periumbilical area.  ID was consulted.  The patient was

on broad-spectrum IV antibiotics.  The area of the periumbilical cellulitis and abscess

improved throughout the hospital course.  The patient was then cleared for discharge by

both General Surgery and ID, and the patient was discharged on oral Keflex x7 days as

per Infectious Disease recommendations.  The patient was doing well prior to being

discharged with no other complaints and back to normal baseline.  On the day of

discharge, vital signs were stable, labs reviewed and stable.  The patient seen,

evaluated, and examined thoroughly on the day of discharge.  No other complaints.  The

patient verbalized understanding and agreed with plan of care to follow up as an

outpatient with the primary care physician in 1 week and General Surgery and ID in 2

weeks time. 

 

MEDICATIONS:  See med reconciliation form.

 

DISPOSITION:  Home.

 

CONDITION:  Stable.

 

DIET:  Heart healthy. 

 

In the event of any worsening symptoms, the patient was advised to come back to the ED

for further evaluation. 

 

Discharge summary took greater than 35 minutes.

 

 

 

 

______________________________

MD THERESA Lewis/EM

D:  08/03/2019 17:34:58

T:  08/04/2019 01:10:37

Job #:  [id number]

## 2023-09-11 NOTE — NUR
PT IS TRANSFERRED FROM ER .PT IS AOX 3.PT HAS UMBILICAL HERNIA .PT C/P RESPIRATION ARE EVEN 
AND UNLABORED .SKIN WARM AND DRY TO TOUCH .PT IS NPO ASSESSMENT DONE .ALL LIGHT WITH IN EACH 
CONTINUE TO MONITOR CALL LIGHT WITH IN REACH CONTINUE TO ,MONITOR Improved

## 2024-12-18 NOTE — XMS REPORT
Copied from CRM #[id number]. Topic: MW Schedule Appointment - MW Schedule Adult Specialty  >> Dec 18, 2024  4:14 PM Garth BROWN wrote:  Baldomero Jackson Jr called to schedule an appointment with a specialist (who has not been converted to enhanced scheduling).    [facility] schedules; Unable to complete scheduling due to Schedule unreleased .  >Selected 'Wrap Up CRM'  >Created new Telephone Encounter after clicking 'Convert to Clinical Call'.  >Selected reason for call 'Appointment'.  >Sent Appointment template and routed as routine priority per Clinician KB page to appropriate clinician pool.    -- DO NOT REPLY / DO NOT REPLY ALL --  -- This inbox is not monitored. If this was sent to the wrong provider or department, reroute message to P [facility] Reroute pool. --  -- Message is from [facility] ([facility]) --  Reason for Appointment Message: Clinician Schedule is unreleased    Reason for Visit: New Patient Visit/ R76.8 (ICD-10-CM) - Positive BRITTA (antinuclear antibody)  M35.00 (ICD-10-CM) - Sjogren's syndrome, with unspecified organ involvement (CMD     Is the patient currently scheduled? No    Preferred time to be seen: Anytime     Caller Information       Contact Date/Time Type Contact Phone/Fax    12/18/2024 04:12 PM CST Phone (Incoming) Baldomero Jackson Jr [phone]            Alternative phone number: N/A    Can a detailed message be left?  Yes - Voicemail   Patient has been advised the message will be addressed within 2-3 business days           
Patient Summary Document

                             Created on: 2019



EMILIA PRASAD

External Reference #: [id number]

: 1960

Sex: Female



Demographics







                          Address                   [address]

[place]  [address]

 

                          Home Phone                [phone]

 

                          Preferred Language        Unknown

 

                          Marital Status            Unknown

 

                          [place] Affiliation     Unknown

 

                          Race                      Unknown

 

                                        Additional Race(s)  

 

                          Ethnic Group              Unknown





Author







                          [facility]                    [facility]

 

                          [facility]

 

                          Address                   Unknown

 

                          Phone                     Unavailable







Care Team Providers







                    Care Team Member Name    Role                Phone

 

                    MANJU ARMIJO    Unavailable         Unavailable







Problems

This patient has no known problems.



Allergies, Adverse Reactions, Alerts

This patient has no known allergies or adverse reactions.



Medications

This patient has no known medications.



Results







           Test Description    Test Time    Test Comments    Text Results    Atomic Results    Result

 Comments

 

                CT ABDOMEN/PELVIS W    2019 17:49:00                                                       

                                                   [place] [address]      Patient Name: EMILIA PRASAD                            
      MR #: [id number]                     : 1960                      
            Age/Sex: 59/F  Acct #: [id number]                              Req
#: [id number]  Adm Physician:                                                   
  Ordered by: NARESH LAL NP                            Report #: [id number]
       Location: ER                                      Room/Bed:              
      
___________________________________________________________________________________________________
   Procedure: [id number] CT/CT ABDOMEN/PELVIS W  Exam Date: 19             
              Exam Time: 1730                                              
REPORT STATUS: Signed    EXAM:  CT of the abdomen and pelvis WITH contrast      
HISTORY:  Abdominal pain, rule out periumbilical abscess versus hernia, history 
 of appendectomy and    COMPARISON:  None available.      TECHNIQUE:   
The abdomen and pelvis were scanned utilizing a multidetector helical scanner.  
 Coronal and sagittal reformats are provided.               PROTOCOL:  Routine  
            IV CONTRAST:  100 cc of Isovue-370.               ORAL CONTRAST:  
Water               RADIATION DOSE: Total DLP: 710.55 mGy*cm                    
    Estimated effective dose:  (DLP x 0.015 x size factor)   Dose modulation, 
iterative reconstruction, and/or weight based adjustment of   the mA/kV was 
utilized to reduce the radiation dose to as low as reasonably   achievable.     
         COMPLICATIONS: None      FINDINGS:   LOWER THORAX: Unremarkable.      
HEPATOBILIARY:     No mass.   No biliary dilation.   No calcified gallstone.   
SPLEEN: No splenomegaly.        PANCREAS: No focal masses or ductal dilatation. 
      ADRENALS:  No discrete adrenal nodule.   KIDNEYS/URETERS:    No 
hydronephrosis, stones, or definite solid mass lesions.          PELVIC 
ORGANS/BLADDER: The visualized pelvic organs appear unremarkable.      GI TRACT:
   No dilation or wall thickening identified.      PERITONEUM / RETROPERITONEUM:
No free air or fluid.   LYMPH NODES: No pathologically enlarged lymph node.   
VESSELS: Minimal scattered atherosclerotic vascular calcifications.   BONES and 
JOINTS:  No aggressive osseous lesion or acute fracture.    SOFT TISSUES: A 
small fat-containing umbilical hernia. In the superficial   periumbilical region
there is prominent focal fat stranding,     2.2  cm (AP) x    2.5  cm (ML) x  
2.5  cm (CC). No central drainable fluid collection.         IMPRESSION:    
Prominent focal, superficial, fat stranding in the periumbilical region.   Cons
iderations include vascular compromise involving the most superficial   portion 
of a small fat-containing umbilical hernia or evolving superficial   phlegmon.  
   Signed by: FOSTER Garcia.O., M.M.M. on 2019 5:57 PM        Dictated
By: IFTIKHAR ADAMS DO  Electronically Signed By: IFTIKHAR ADAMS DO on 19  
Transcribed By: DANELLE on 19       COPY TO:   NARESH LAL NP
normal...